# Patient Record
Sex: FEMALE | Race: WHITE | NOT HISPANIC OR LATINO
[De-identification: names, ages, dates, MRNs, and addresses within clinical notes are randomized per-mention and may not be internally consistent; named-entity substitution may affect disease eponyms.]

---

## 2017-01-05 ENCOUNTER — CHARTING TRANS (OUTPATIENT)
Dept: OTHER | Age: 57
End: 2017-01-05

## 2017-03-07 ENCOUNTER — CHARTING TRANS (OUTPATIENT)
Dept: OTHER | Age: 57
End: 2017-03-07

## 2017-07-10 ENCOUNTER — CHARTING TRANS (OUTPATIENT)
Dept: OTHER | Age: 57
End: 2017-07-10

## 2018-11-03 VITALS
HEART RATE: 78 BPM | BODY MASS INDEX: 28.73 KG/M2 | TEMPERATURE: 99.2 F | SYSTOLIC BLOOD PRESSURE: 122 MMHG | HEIGHT: 68 IN | RESPIRATION RATE: 16 BRPM | WEIGHT: 189.56 LBS | DIASTOLIC BLOOD PRESSURE: 72 MMHG

## 2018-11-05 VITALS
BODY MASS INDEX: 28.57 KG/M2 | TEMPERATURE: 98.5 F | SYSTOLIC BLOOD PRESSURE: 124 MMHG | DIASTOLIC BLOOD PRESSURE: 78 MMHG | RESPIRATION RATE: 18 BRPM | HEART RATE: 80 BPM | WEIGHT: 188.5 LBS | HEIGHT: 68 IN

## 2025-06-05 RX ORDER — FOLIC ACID 1 MG/1
TABLET ORAL WEEKLY
COMMUNITY

## 2025-06-05 RX ORDER — HYDROCODONE BITARTRATE AND ACETAMINOPHEN 5; 325 MG/1; MG/1
1 TABLET ORAL 2 TIMES DAILY PRN
COMMUNITY
End: 2025-06-11

## 2025-06-05 RX ORDER — ESCITALOPRAM OXALATE 20 MG/1
20 TABLET ORAL EVERY MORNING
COMMUNITY

## 2025-06-05 RX ORDER — MULTIVIT-MIN/IRON FUM/FOLIC AC 7.5 MG-4
1 TABLET ORAL DAILY
COMMUNITY

## 2025-06-05 RX ORDER — TRAZODONE HYDROCHLORIDE 50 MG/1
50 TABLET ORAL NIGHTLY PRN
COMMUNITY

## 2025-06-05 RX ORDER — LURASIDONE HYDROCHLORIDE 20 MG/1
10 TABLET, FILM COATED ORAL
COMMUNITY

## 2025-06-05 RX ORDER — CLONAZEPAM 0.5 MG/1
0.5 TABLET ORAL 2 TIMES DAILY PRN
COMMUNITY

## 2025-06-05 RX ORDER — DEXTROAMPHETAMINE SACCHARATE, AMPHETAMINE ASPARTATE, DEXTROAMPHETAMINE SULFATE AND AMPHETAMINE SULFATE 5; 5; 5; 5 MG/1; MG/1; MG/1; MG/1
TABLET ORAL EVERY MORNING
COMMUNITY

## 2025-06-05 RX ORDER — ALBUTEROL SULFATE 90 UG/1
INHALANT RESPIRATORY (INHALATION) EVERY 6 HOURS PRN
COMMUNITY

## 2025-06-05 RX ORDER — BUPROPION HYDROCHLORIDE 300 MG/1
300 TABLET ORAL EVERY MORNING
COMMUNITY

## 2025-06-05 RX ORDER — CELECOXIB 100 MG/1
100 CAPSULE ORAL 2 TIMES DAILY
COMMUNITY
End: 2025-06-11

## 2025-06-09 ENCOUNTER — ANESTHESIA EVENT (OUTPATIENT)
Dept: SURGERY | Facility: HOSPITAL | Age: 65
End: 2025-06-09
Payer: COMMERCIAL

## 2025-06-09 ENCOUNTER — HOSPITAL ENCOUNTER (OUTPATIENT)
Facility: HOSPITAL | Age: 65
Discharge: HOME | End: 2025-06-11
Attending: ORTHOPAEDIC SURGERY | Admitting: ORTHOPAEDIC SURGERY
Payer: COMMERCIAL

## 2025-06-09 ENCOUNTER — APPOINTMENT (OUTPATIENT)
Dept: GENERAL RADIOLOGY | Facility: HOSPITAL | Age: 65
End: 2025-06-09
Attending: ORTHOPAEDIC SURGERY
Payer: COMMERCIAL

## 2025-06-09 ENCOUNTER — ANESTHESIA (OUTPATIENT)
Dept: SURGERY | Facility: HOSPITAL | Age: 65
End: 2025-06-09
Payer: COMMERCIAL

## 2025-06-09 DIAGNOSIS — M48.02 CERVICAL SPINAL STENOSIS: Primary | ICD-10-CM

## 2025-06-09 PROBLEM — M54.12 CERVICAL RADICULOPATHY: Status: ACTIVE | Noted: 2025-06-09

## 2025-06-09 PROBLEM — G47.33 OSA (OBSTRUCTIVE SLEEP APNEA): Status: ACTIVE | Noted: 2025-06-09

## 2025-06-09 PROBLEM — F41.9 ANXIETY AND DEPRESSION: Status: ACTIVE | Noted: 2025-06-09

## 2025-06-09 PROBLEM — F32.A ANXIETY AND DEPRESSION: Status: ACTIVE | Noted: 2025-06-09

## 2025-06-09 LAB
HCT VFR BLD AUTO: 38.6 % (ref 35–48)
HGB BLD-MCNC: 13.1 G/DL (ref 12–16)

## 2025-06-09 PROCEDURE — 99204 OFFICE O/P NEW MOD 45 MIN: CPT | Performed by: HOSPITALIST

## 2025-06-09 PROCEDURE — 76000 FLUOROSCOPY <1 HR PHYS/QHP: CPT | Performed by: ORTHOPAEDIC SURGERY

## 2025-06-09 DEVICE — GRAFT BNE SUB 5CC M MTRX OSTEOCEL PRO: Type: IMPLANTABLE DEVICE | Site: NECK | Status: FUNCTIONAL

## 2025-06-09 DEVICE — COHERE CERVICAL, 6X14X12MM 7°
Type: IMPLANTABLE DEVICE | Site: SPINE CERVICAL | Status: FUNCTIONAL
Brand: COHERE

## 2025-06-09 DEVICE — IMPLANTABLE DEVICE: Type: IMPLANTABLE DEVICE | Site: NECK | Status: FUNCTIONAL

## 2025-06-09 DEVICE — IMPLANTABLE DEVICE: Type: IMPLANTABLE DEVICE | Site: SPINE CERVICAL | Status: FUNCTIONAL

## 2025-06-09 RX ORDER — SENNOSIDES 8.6 MG
17.2 TABLET ORAL NIGHTLY
Status: DISCONTINUED | OUTPATIENT
Start: 2025-06-09 | End: 2025-06-11

## 2025-06-09 RX ORDER — DEXTROAMPHETAMINE SACCHARATE, AMPHETAMINE ASPARTATE, DEXTROAMPHETAMINE SULFATE AND AMPHETAMINE SULFATE 2.5; 2.5; 2.5; 2.5 MG/1; MG/1; MG/1; MG/1
20 TABLET ORAL DAILY
Status: DISCONTINUED | OUTPATIENT
Start: 2025-06-10 | End: 2025-06-11

## 2025-06-09 RX ORDER — DOCUSATE SODIUM 100 MG/1
100 CAPSULE, LIQUID FILLED ORAL 2 TIMES DAILY
Status: DISCONTINUED | OUTPATIENT
Start: 2025-06-09 | End: 2025-06-11

## 2025-06-09 RX ORDER — POLYETHYLENE GLYCOL 3350 17 G/17G
17 POWDER, FOR SOLUTION ORAL DAILY PRN
Status: DISCONTINUED | OUTPATIENT
Start: 2025-06-09 | End: 2025-06-11

## 2025-06-09 RX ORDER — ONDANSETRON 2 MG/ML
INJECTION INTRAMUSCULAR; INTRAVENOUS AS NEEDED
Status: DISCONTINUED | OUTPATIENT
Start: 2025-06-09 | End: 2025-06-09 | Stop reason: SURG

## 2025-06-09 RX ORDER — MORPHINE SULFATE 4 MG/ML
4 INJECTION, SOLUTION INTRAMUSCULAR; INTRAVENOUS EVERY 10 MIN PRN
Status: DISCONTINUED | OUTPATIENT
Start: 2025-06-09 | End: 2025-06-09 | Stop reason: HOSPADM

## 2025-06-09 RX ORDER — LURASIDONE HYDROCHLORIDE 20 MG/1
10 TABLET, FILM COATED ORAL
Status: DISCONTINUED | OUTPATIENT
Start: 2025-06-10 | End: 2025-06-11

## 2025-06-09 RX ORDER — CYCLOBENZAPRINE HCL 5 MG
5 TABLET ORAL EVERY 6 HOURS PRN
Status: DISCONTINUED | OUTPATIENT
Start: 2025-06-09 | End: 2025-06-11

## 2025-06-09 RX ORDER — MORPHINE SULFATE 10 MG/ML
6 INJECTION, SOLUTION INTRAMUSCULAR; INTRAVENOUS EVERY 10 MIN PRN
Status: DISCONTINUED | OUTPATIENT
Start: 2025-06-09 | End: 2025-06-09 | Stop reason: HOSPADM

## 2025-06-09 RX ORDER — DEXAMETHASONE SODIUM PHOSPHATE 4 MG/ML
VIAL (ML) INJECTION AS NEEDED
Status: DISCONTINUED | OUTPATIENT
Start: 2025-06-09 | End: 2025-06-09 | Stop reason: SURG

## 2025-06-09 RX ORDER — SODIUM CHLORIDE 9 MG/ML
INJECTION, SOLUTION INTRAVENOUS CONTINUOUS
Status: DISCONTINUED | OUTPATIENT
Start: 2025-06-09 | End: 2025-06-11

## 2025-06-09 RX ORDER — BISACODYL 10 MG
10 SUPPOSITORY, RECTAL RECTAL
Status: DISCONTINUED | OUTPATIENT
Start: 2025-06-09 | End: 2025-06-11

## 2025-06-09 RX ORDER — HYDROMORPHONE HYDROCHLORIDE 1 MG/ML
0.5 INJECTION, SOLUTION INTRAMUSCULAR; INTRAVENOUS; SUBCUTANEOUS EVERY 2 HOUR PRN
Status: DISCONTINUED | OUTPATIENT
Start: 2025-06-09 | End: 2025-06-11

## 2025-06-09 RX ORDER — SODIUM CHLORIDE, SODIUM LACTATE, POTASSIUM CHLORIDE, CALCIUM CHLORIDE 600; 310; 30; 20 MG/100ML; MG/100ML; MG/100ML; MG/100ML
INJECTION, SOLUTION INTRAVENOUS CONTINUOUS
Status: DISCONTINUED | OUTPATIENT
Start: 2025-06-09 | End: 2025-06-11

## 2025-06-09 RX ORDER — MIDAZOLAM HYDROCHLORIDE 1 MG/ML
INJECTION INTRAMUSCULAR; INTRAVENOUS AS NEEDED
Status: DISCONTINUED | OUTPATIENT
Start: 2025-06-09 | End: 2025-06-09 | Stop reason: SURG

## 2025-06-09 RX ORDER — HYDROMORPHONE HYDROCHLORIDE 1 MG/ML
INJECTION, SOLUTION INTRAMUSCULAR; INTRAVENOUS; SUBCUTANEOUS AS NEEDED
Status: DISCONTINUED | OUTPATIENT
Start: 2025-06-09 | End: 2025-06-09 | Stop reason: SURG

## 2025-06-09 RX ORDER — ONDANSETRON 2 MG/ML
4 INJECTION INTRAMUSCULAR; INTRAVENOUS EVERY 6 HOURS PRN
Status: DISCONTINUED | OUTPATIENT
Start: 2025-06-09 | End: 2025-06-11

## 2025-06-09 RX ORDER — OXYCODONE AND ACETAMINOPHEN 10; 325 MG/1; MG/1
2 TABLET ORAL EVERY 6 HOURS PRN
Status: DISCONTINUED | OUTPATIENT
Start: 2025-06-09 | End: 2025-06-11

## 2025-06-09 RX ORDER — SODIUM CHLORIDE, SODIUM LACTATE, POTASSIUM CHLORIDE, CALCIUM CHLORIDE 600; 310; 30; 20 MG/100ML; MG/100ML; MG/100ML; MG/100ML
INJECTION, SOLUTION INTRAVENOUS CONTINUOUS
Status: DISCONTINUED | OUTPATIENT
Start: 2025-06-09 | End: 2025-06-09 | Stop reason: HOSPADM

## 2025-06-09 RX ORDER — METHOCARBAMOL 100 MG/ML
1000 INJECTION, SOLUTION INTRAMUSCULAR; INTRAVENOUS ONCE AS NEEDED
Status: DISCONTINUED | OUTPATIENT
Start: 2025-06-09 | End: 2025-06-09 | Stop reason: HOSPADM

## 2025-06-09 RX ORDER — NALOXONE HYDROCHLORIDE 0.4 MG/ML
80 INJECTION, SOLUTION INTRAMUSCULAR; INTRAVENOUS; SUBCUTANEOUS AS NEEDED
Status: DISCONTINUED | OUTPATIENT
Start: 2025-06-09 | End: 2025-06-09 | Stop reason: HOSPADM

## 2025-06-09 RX ORDER — SODIUM PHOSPHATE, DIBASIC AND SODIUM PHOSPHATE, MONOBASIC 7; 19 G/230ML; G/230ML
1 ENEMA RECTAL ONCE AS NEEDED
Status: DISCONTINUED | OUTPATIENT
Start: 2025-06-09 | End: 2025-06-11

## 2025-06-09 RX ORDER — TRANEXAMIC ACID 10 MG/ML
INJECTION, SOLUTION INTRAVENOUS AS NEEDED
Status: DISCONTINUED | OUTPATIENT
Start: 2025-06-09 | End: 2025-06-09 | Stop reason: SURG

## 2025-06-09 RX ORDER — ACETAMINOPHEN 500 MG
1000 TABLET ORAL ONCE
Status: DISCONTINUED | OUTPATIENT
Start: 2025-06-09 | End: 2025-06-09 | Stop reason: HOSPADM

## 2025-06-09 RX ORDER — DIPHENHYDRAMINE HCL 25 MG
25 CAPSULE ORAL EVERY 4 HOURS PRN
Status: DISCONTINUED | OUTPATIENT
Start: 2025-06-09 | End: 2025-06-11

## 2025-06-09 RX ORDER — BUPROPION HYDROCHLORIDE 300 MG/1
300 TABLET ORAL EVERY MORNING
Status: DISCONTINUED | OUTPATIENT
Start: 2025-06-10 | End: 2025-06-11

## 2025-06-09 RX ORDER — PROCHLORPERAZINE EDISYLATE 5 MG/ML
5 INJECTION INTRAMUSCULAR; INTRAVENOUS EVERY 8 HOURS PRN
Status: DISCONTINUED | OUTPATIENT
Start: 2025-06-09 | End: 2025-06-11

## 2025-06-09 RX ORDER — HYDROMORPHONE HYDROCHLORIDE 1 MG/ML
0.6 INJECTION, SOLUTION INTRAMUSCULAR; INTRAVENOUS; SUBCUTANEOUS EVERY 5 MIN PRN
Status: DISCONTINUED | OUTPATIENT
Start: 2025-06-09 | End: 2025-06-09 | Stop reason: HOSPADM

## 2025-06-09 RX ORDER — LIDOCAINE HYDROCHLORIDE 10 MG/ML
INJECTION, SOLUTION EPIDURAL; INFILTRATION; INTRACAUDAL; PERINEURAL AS NEEDED
Status: DISCONTINUED | OUTPATIENT
Start: 2025-06-09 | End: 2025-06-09 | Stop reason: SURG

## 2025-06-09 RX ORDER — OXYCODONE AND ACETAMINOPHEN 10; 325 MG/1; MG/1
1 TABLET ORAL EVERY 4 HOURS PRN
Status: DISCONTINUED | OUTPATIENT
Start: 2025-06-09 | End: 2025-06-11

## 2025-06-09 RX ORDER — MORPHINE SULFATE 4 MG/ML
2 INJECTION, SOLUTION INTRAMUSCULAR; INTRAVENOUS EVERY 10 MIN PRN
Status: DISCONTINUED | OUTPATIENT
Start: 2025-06-09 | End: 2025-06-09 | Stop reason: HOSPADM

## 2025-06-09 RX ORDER — HYDROMORPHONE HYDROCHLORIDE 1 MG/ML
0.4 INJECTION, SOLUTION INTRAMUSCULAR; INTRAVENOUS; SUBCUTANEOUS EVERY 5 MIN PRN
Status: DISCONTINUED | OUTPATIENT
Start: 2025-06-09 | End: 2025-06-09 | Stop reason: HOSPADM

## 2025-06-09 RX ORDER — HYDROMORPHONE HYDROCHLORIDE 1 MG/ML
0.2 INJECTION, SOLUTION INTRAMUSCULAR; INTRAVENOUS; SUBCUTANEOUS EVERY 5 MIN PRN
Status: DISCONTINUED | OUTPATIENT
Start: 2025-06-09 | End: 2025-06-09 | Stop reason: HOSPADM

## 2025-06-09 RX ORDER — HYDROMORPHONE HYDROCHLORIDE 1 MG/ML
1 INJECTION, SOLUTION INTRAMUSCULAR; INTRAVENOUS; SUBCUTANEOUS EVERY 2 HOUR PRN
Status: DISCONTINUED | OUTPATIENT
Start: 2025-06-09 | End: 2025-06-11

## 2025-06-09 RX ORDER — TRAZODONE HYDROCHLORIDE 50 MG/1
50 TABLET ORAL NIGHTLY PRN
Status: DISCONTINUED | OUTPATIENT
Start: 2025-06-09 | End: 2025-06-11

## 2025-06-09 RX ORDER — DIPHENHYDRAMINE HYDROCHLORIDE 50 MG/ML
25 INJECTION, SOLUTION INTRAMUSCULAR; INTRAVENOUS EVERY 4 HOURS PRN
Status: DISCONTINUED | OUTPATIENT
Start: 2025-06-09 | End: 2025-06-11

## 2025-06-09 RX ORDER — ACETAMINOPHEN 10 MG/ML
1000 INJECTION, SOLUTION INTRAVENOUS ONCE AS NEEDED
Status: COMPLETED | OUTPATIENT
Start: 2025-06-09 | End: 2025-06-11

## 2025-06-09 RX ORDER — ESCITALOPRAM OXALATE 20 MG/1
20 TABLET ORAL EVERY MORNING
Status: DISCONTINUED | OUTPATIENT
Start: 2025-06-10 | End: 2025-06-11

## 2025-06-09 RX ORDER — ALBUTEROL SULFATE 0.83 MG/ML
2.5 SOLUTION RESPIRATORY (INHALATION) AS NEEDED
Status: DISCONTINUED | OUTPATIENT
Start: 2025-06-09 | End: 2025-06-09 | Stop reason: HOSPADM

## 2025-06-09 RX ADMIN — TRANEXAMIC ACID 1000 MG: 10 INJECTION, SOLUTION INTRAVENOUS at 11:48:00

## 2025-06-09 RX ADMIN — DEXAMETHASONE SODIUM PHOSPHATE 10 MG: 4 MG/ML VIAL (ML) INJECTION at 11:54:00

## 2025-06-09 RX ADMIN — LIDOCAINE HYDROCHLORIDE 50 MG: 10 INJECTION, SOLUTION EPIDURAL; INFILTRATION; INTRACAUDAL; PERINEURAL at 11:40:00

## 2025-06-09 RX ADMIN — SODIUM CHLORIDE, SODIUM LACTATE, POTASSIUM CHLORIDE, CALCIUM CHLORIDE: 600; 310; 30; 20 INJECTION, SOLUTION INTRAVENOUS at 13:36:00

## 2025-06-09 RX ADMIN — ONDANSETRON 4 MG: 2 INJECTION INTRAMUSCULAR; INTRAVENOUS at 11:54:00

## 2025-06-09 RX ADMIN — MIDAZOLAM HYDROCHLORIDE 2 MG: 1 INJECTION INTRAMUSCULAR; INTRAVENOUS at 11:33:00

## 2025-06-09 RX ADMIN — SODIUM CHLORIDE, SODIUM LACTATE, POTASSIUM CHLORIDE, CALCIUM CHLORIDE: 600; 310; 30; 20 INJECTION, SOLUTION INTRAVENOUS at 11:33:00

## 2025-06-09 RX ADMIN — HYDROMORPHONE HYDROCHLORIDE 0.5 MG: 1 INJECTION, SOLUTION INTRAMUSCULAR; INTRAVENOUS; SUBCUTANEOUS at 12:49:00

## 2025-06-09 RX ADMIN — HYDROMORPHONE HYDROCHLORIDE 0.5 MG: 1 INJECTION, SOLUTION INTRAMUSCULAR; INTRAVENOUS; SUBCUTANEOUS at 12:00:00

## 2025-06-09 RX ADMIN — SODIUM CHLORIDE, SODIUM LACTATE, POTASSIUM CHLORIDE, CALCIUM CHLORIDE: 600; 310; 30; 20 INJECTION, SOLUTION INTRAVENOUS at 14:41:00

## 2025-06-09 NOTE — RESPIRATORY THERAPY NOTE
CPAP/BIPAP EVALUATION: Done     CPAP/BIPAP INITIATION: Patient refused CPAP during this admission.     NOTES: Patient was previously evaluated and diagnosed with OMAR. She refused CPAP during this admission. RT will be available for further assistance and provide necessary equipment.

## 2025-06-09 NOTE — BRIEF OP NOTE
Pre-Operative Diagnosis: Other cervical disc degeneration of cervical region, not otherwise specified, Spinal stenosis of cervical region     Post-Operative Diagnosis: Other cervical disc degeneration of cervical region, not otherwise specified, Spinal stenosis of cervical region      Procedure Performed:   C4-5, C5-6, C6-7 anterior cervical discectomy and fusion with use of allograft bone    Surgeons and Role:     * Shahida Bee MD - Primary    Assistant(s):  Moon Thomas PA-C     Surgical Findings: as expected     Specimen:      Estimated Blood Loss: Blood Output: 30 mL (6/9/2025  2:37 PM)      Dictation Number:      Moon Nguyen PA-C  6/9/2025  3:09 PM

## 2025-06-09 NOTE — CONSULTS
[facility]    PATIENT'S NAME: BRITTA STEWART   ATTENDING PHYSICIAN: Shahida Bee MD   CONSULTING PHYSICIAN: Israel Escamilla MD   PATIENT ACCOUNT#:   [id number]    LOCATION:  [facility] PACU 9 [facility] 10  MEDICAL RECORD #:   [id number]       YOB: 1960  ADMISSION DATE:       06/09/2025      CONSULT DATE:  06/09/2025    REPORT OF CONSULTATION      REASON FOR ADMISSION:  Post anterior cervical discectomy and fusion.    HISTORY OF PRESENT ILLNESS:  The patient is a 64-year-old  female with chronic neck pain and cervical spinal stenosis with radiculopathy, failed outpatient conservative medical management options, scheduled today for above-mentioned procedure by her orthopedic surgeon, Dr. Bee.  Postoperatively transferred to PACU for further monitoring.     PAST MEDICAL HISTORY:  Degenerative joint disease of cervical spine, anxiety, attention deficit disorder, fibromyalgia, obstructive sleep apnea.    PAST SURGICAL HISTORY:  Bilateral foot surgery.    MEDICATIONS:  Please see medication reconciliation list.     ALLERGIES:  No known drug allergies.     SOCIAL HISTORY:  No tobacco, alcohol, or drug use.  Independent for basic activities of daily living.    FAMILY HISTORY:  Mother had COPD.  Brother had heart disease.    REVIEW OF SYSTEMS:  Currently resting in bed.  Neck discomfort.  No upper extremity tingling or numbness.  No chest pain.  No shortness of breath.  Other 12-point review of systems negative.      PHYSICAL EXAMINATION:    GENERAL:  Alert, oriented to time, place, and person, mild to moderate distress.   VITAL SIGNS:  Temperature 97.2.  Pulse 76.  Respiratory rate 13.  Blood pressure 126/65.  Pulse ox 93% on room air.  HEENT:  Atraumatic.  Oropharynx clear.  Moist mucous membranes.   Ears, nose normal.  Eyes:  Anicteric sclerae.  NECK:  Cervical collar in place.  Anterior cervical dressing with Hemovac surgical drain.  LUNGS:  Clear to auscultation bilaterally.  Normal  respiratory effort.  HEART:  Regular rate and rhythm.  S1 and S2 auscultated.  No murmur.  ABDOMEN:  Soft, nondistended.  Positive bowel sounds.  EXTREMITIES:  No peripheral edema, clubbing, or cyanosis.  NEUROLOGIC:  Motor and sensory intact.     ASSESSMENT AND PLAN:    1.   Cervical spinal stenosis with radiculopathy status post C4-C5, C5-C6, and C6-C7 anterior cervical discectomy and fusion with use of allograft bone.  Monitor surgical wound and drain.  Neuro checks.  DVT prophylaxis.  Physical and occupational therapy.   2.   Obstructive sleep apnea.  Apply obstructive sleep apnea protocol and monitor.  3.   Anxiety and depression.  Continue home medications.    Dictated By Israel Escamilla MD  d: 06/09/2025 15:51:13  t: 06/09/2025 16:30:38  Job [id number]  FB/

## 2025-06-09 NOTE — ANESTHESIA POSTPROCEDURE EVALUATION
Patient: Tri Lawrence    Procedure Summary       Date: 06/09/25 Room / Location: [facility] MAIN OR 09 / [facility] MAIN OR    Anesthesia Start: 1133 Anesthesia Stop: 1516    Procedure: C4-5, C5-6, C6-7 anterior cervical discectomy and fusion with use of allograft bone (Spine Cervical) Diagnosis: (Other cervical disc degeneration of cervical region, not otherwise specified, Spinal stenosis of cervical region)    Surgeons: Shahida Bee MD Anesthesiologist: Winter Soliman MD    Anesthesia Type: general ASA Status: 2            Anesthesia Type: general    Vitals Value Taken Time   /75 06/09/25 15:12   Temp 97.2 °F (36.2 °C) 06/09/25 15:12   Pulse 73 06/09/25 15:16   Resp 12 06/09/25 15:16   SpO2 99 % 06/09/25 15:16   Vitals shown include unfiled device data.    EM AN Post Evaluation:   Patient Evaluated in PACU  Patient Participation: complete - patient cannot participate  Level of Consciousness: responsive to painful stimuli  Pain Management: adequate  Airway Patency:patent  Dental exam unchanged from preop  Yes    Nausea/Vomiting: none  Cardiovascular Status: acceptable  Respiratory Status: acceptable  Postoperative Hydration acceptable      Lexi Jansen CRNA  6/9/2025 3:16 PM

## 2025-06-09 NOTE — ANESTHESIA PROCEDURE NOTES
Airway  Date/Time: 6/9/2025 11:42 AM  Airway not difficult    General Information and Staff   Patient location during procedure: OR  Anesthesiologist: Winter Soliman MD  Resident/CRNA: Lexi Jansen CRNA  Performed: CRNA   Performed by: Lexi Jansen CRNA  Authorized by: Winter Soliman MD        Indications and Patient Condition  Indications for airway management: anesthesia  Sedation level: deep      Preoxygenated: yesPatient position: sniffing  MILS maintained throughout    Mask difficulty assessment: 0 - not attempted    Final Airway Details    Final airway type: endotracheal airway    Successful airway: ETT     Successful intubation technique: Video laryngoscopy  Endotracheal tube insertion site: oral  Blade type: Perez.  Blade size: #3  ETT size (mm): 7.0    Cormack-Lehane Classification: grade I - full view of glottis  Placement verified by: capnometry   Measured from: lips  ETT to lips (cm): 21  Number of attempts at approach: 1

## 2025-06-09 NOTE — ANESTHESIA PREPROCEDURE EVALUATION
Anesthesia PreOp Note    HPI:     Tri Lawrence is a 64 year old female who presents for preoperative consultation requested by: Shahida Bee MD    Date of Surgery: 6/9/2025    Procedure(s):  C4-5, C5-6, C6-7 anterior cervical discectomy and fusion with use of allograft bone  Indication: Other cervical disc degeneration of cervical region, not otherwise specified, Spinal stenosis of cervical region    Relevant Problems   No relevant active problems       NPO:  Last Liquid Consumption Date: 06/08/25  Last Liquid Consumption Time: 2000  Last Solid Consumption Date: 06/08/25  Last Solid Consumption Time: 2300  Last Liquid Consumption Date: 06/08/25          History Review:  There are no active problems to display for this patient.      Past Medical History[1]    Past Surgical History[2]    Prescriptions Prior to Admission[3]  Current Medications and Prescriptions Ordered in Epic[4]    Allergies[5]    Family History[6]  Social Hx on file[7]    Available pre-op labs reviewed.             Vital Signs:  Body mass index is 27.67 kg/m².   height is 1.727 m (5' 8\") and weight is 82.6 kg (182 lb). Her oral temperature is 98.3 °F (36.8 °C). Her blood pressure is 134/60 and her pulse is 62. Her respiration is 16 and oxygen saturation is 95%.   Vitals:    06/05/25 1430 06/09/25 0750 06/09/25 0802   BP:   134/60   Pulse:   62   Resp:   16   Temp:   98.3 °F (36.8 °C)   TempSrc:   Oral   SpO2:   95%   Weight: 80.7 kg (178 lb) 82.6 kg (182 lb)    Height: 1.727 m (5' 8\")          Anesthesia Evaluation     Patient summary reviewed and Nursing notes reviewed    History of anesthetic complications   Airway   Mallampati: II  Neck ROM: limited  Dental    (+) implants        Pulmonary - normal exam   (+) sleep apnea on CPAP  Cardiovascular - normal exam  Exercise tolerance: good  (-) past MI, CAD    NYHA Classification: I  ECG reviewed  ROS comment: Rest       ECG    Normal sinus rhythm.    No evidence of myocardial ischemia.        Standing HR:60 bpmStanding BP:110/74 mmHg       Stress       Peak HR: 116 bpm                           HR BP Product: 25772    Peak BP: 144/62 mmHg                       Max Exercise: 7.7 METS    Predicted HR: 162 bpm    % of predicted HR: 72    Test Duration: 6:31 min    Reason for Termination: Dyspnea       Stress Interpretation       With exercise, there is appropriate augmentation of all visualized    myocardial segments.       Results       ECG    The patient developed 1 mm of horizontal ST depression in the    inferolateral leads with exercise that resolved in recovery.       Arrhythmias    No arrhythmias were observed during the test.       Symptoms       Neuro/Psych    (+)  neuromuscular disease, anxiety/panic attacks,  attention deficit hyperactivity disorder, depression      GI/Hepatic/Renal    (-) hepatitis, liver disease, renal disease    Comments: Vocal cords condition : no special treatment     Endo/Other    (-) diabetes mellitus, hypothyroidism    Comments: DR Lilly clearance   Abdominal  - normal exam                 Anesthesia Plan:   ASA:  2  Plan:   General  Airway:  Video laryngoscope and ETT  Informed Consent Plan and Risks Discussed With:  Patient  Discussed plan with:  CRNA, attending and surgeon  Provider Attestation (if preop done by other):  GA/ETT video /PONV/dental damages etc      I have informed Tri Lawrence and/or legal guardian or family member of the nature of the anesthetic plan, benefits, risks including possible dental damage if relevant, major complications, and any alternative forms of anesthetic management.   All of the patient's questions were answered to the best of my ability. The patient desires the anesthetic management as planned.  ANNIKA BARRIENTOS MD  6/9/2025 10:08 AM  Present on Admission:  **None**           [1]   Past Medical History:   Anesthesia complication    come out slowly    Anxiety state    Attention deficit hyperactivity disorder (ADHD)    Back problem     Depression    Fibromyalgia    Migraines    Osteoarthritis    Sleep apnea    uses CPAP on and off    Visual impairment    glasses    Vocal cord dysfunction   [2]   Past Surgical History:  Procedure Laterality Date    Other surgical history Bilateral     feet surgery  x2   [3]   Medications Prior to Admission   Medication Sig Dispense Refill Last Dose/Taking    HYDROcodone-acetaminophen 5-325 MG Oral Tab Take 1 tablet by mouth 2 (two) times daily as needed for Pain.   6/9/2025 at  5:00 AM    buPROPion  MG Oral Tablet 24 Hr Take 1 tablet (300 mg total) by mouth every morning.   6/9/2025 at  5:00 AM    escitalopram 20 MG Oral Tab Take 1 tablet (20 mg total) by mouth every morning.   6/9/2025 at  5:00 AM    amphetamine-dextroamphetamine 20 MG Oral Tab Take by mouth every morning.   6/9/2025 at  5:00 AM    celecoxib 100 MG Oral Cap Take 1 capsule (100 mg total) by mouth 2 (two) times daily.   5/29/2025    lurasidone 20 MG Oral Tab Take 0.5 tablets (10 mg total) by mouth daily with breakfast.   5/22/2025    clonazePAM 0.5 MG Oral Tab Take 1 tablet (0.5 mg total) by mouth 2 (two) times daily as needed for Anxiety.   Past Month    traZODone 50 MG Oral Tab Take 1 tablet (50 mg total) by mouth nightly as needed for Sleep.   6/8/2025 at  9:00 PM    Cholecalciferol (VITAMIN D3) 1.25 MG (80752 UT) Oral Cap Take by mouth once a week.   Past Week    Multiple Vitamins-Minerals (MULTI-VITAMIN/MINERALS) Oral Tab Take 1 tablet by mouth daily.   Past Week    albuterol 108 (90 Base) MCG/ACT Inhalation Aero Soln Inhale into the lungs every 6 (six) hours as needed for Wheezing.   More than a month   [4]   Current Facility-Administered Medications Ordered in Epic   Medication Dose Route Frequency Provider Last Rate Last Admin    lactated ringers infusion   Intravenous Continuous Shahida Bee MD 20 mL/hr at 06/09/25 0806 New Bag at 06/09/25 0806    acetaminophen (Tylenol Extra Strength) tab 1,000 mg  1,000 mg Oral Once Rohit  MD Shahida        ceFAZolin (Ancef) 2g in 10mL IV syringe premix  2 g Intravenous 30 Min Pre-Op Moon Nguyen PA-C         No current Epic-ordered outpatient medications on file.   [5]   Allergies  Allergen Reactions    Kenalog Tightness in Throat   [6]   Family History  Problem Relation Age of Onset    COPD Mother     Heart Disorder Brother    [7]   Social History  Socioeconomic History    Marital status:    Tobacco Use    Smoking status: Never    Smokeless tobacco: Never   Vaping Use    Vaping status: Never Used   Substance and Sexual Activity    Alcohol use: Never    Drug use: Never

## 2025-06-09 NOTE — INTERVAL H&P NOTE
Pre-op Diagnosis: Other cervical disc degeneration of cervical region, not otherwise specified, Spinal stenosis of cervical region    The above referenced H&P was reviewed by Shahida Bee MD, SHAHAB on 6/9/2025, the patient was examined and no significant changes have occurred in the patient's condition since the H&P was performed.  I discussed with the patient and/or legal representative the potential benefits, risks and side effects of this procedure; the likelihood of the patient achieving goals; and potential problems that might occur during recuperation.  I discussed reasonable alternatives to the procedure, including risks, benefits and side effects related to the alternatives and risks related to not receiving this procedure.  We will proceed with procedure as planned.

## 2025-06-09 NOTE — OPERATIVE REPORT
\  PREOPERATIVE DIAGNOSIS:    1.       Cervical spondylosis.  2.       Cervical stenosis.  3.       Cervical Radiculopathy.        POSTOPERATIVE DIAGNOSIS:    1.       Cervical spondylosis.  2.       Cervical stenosis.  3.       Cervical Radiculopathy.  .     PROCEDURE:    1.       Anterior cervical discectomy and fusion at C4-5, C5-6, C6-7.  2.       Use of interbody cage at C4-5, C5-6, C6-7 .  3.       Use of local autograft bone.  4.       Use of allograft bone.  5.       Anterior cervical instrumentation spanning the C4-5, C5-6, C6-7 disc space with an anterior cervical plate and screws.  6.       Use of intraoperative fluoroscopy.  7.       Use of intraoperative neuromonitoring.           ASSISTANTS:   Moon Aguero PA-C, who assisted in positioning, prepping, draping, retracting, and wound closure.     ANESTHESIA:  General endotracheal.     COMPLICATIONS:  None.     ESTIMATED BLOOD LOSS:  30 mL.      DEEP VEIN THROMBOSIS PROPHYLAXIS:  SCDs and TEDs to lower extremities.     PREOPERATIVE ANTIBIOTICS:  Ancef.     IMPLANTS:  Nuvasive.     INDICATIONS:  Patient is a 64-year-old female with neck pain and upper extremity radiculopathy.  /he presented to my office with neck pain and upper extremity radicular symptoms which failed conservative management.  The MRI did show moderate central and foraminal stenosis from disc/osteophytes at the 3 levels.  I recommended surgical intervention and discussed that in detail with the patient.  Informed consent for surgery was obtained.  I specifically discussed with the patient that the risks surgery include, but are not limited to, infection, nerve injury, vessel injury, need for reoperation, possibility he may not improve, anesthetic complications, cardiac complications, and death in the perioperative period.     OPERATIVE TECHNIQUE:  The patient was seen preoperatively and surgical site was marked.  SCDs and TEDs were placed on the lower extremities.  She was brought to  the OR and, after a successful induction of anesthesia, needles for neuromonitoring were placed in the upper and lower extremities.  A Roberts catheter was placed.  The neck was then prepped and draped in the usual manner for a procedure of this sort.  Time-out was then performed.  We then used intraoperative fluoroscopy to tom the skin for our left-sided incision.  A left carotid incision was made centered with the C5-6 level on the left side.  A knife was used for the skin, electrocautery for hemostasis.  The platysma was incised in line with the skin incision, and careful dissection was carried down through avascular planes exposing the C4, C5, C6 and C7 vertebral segments.  We verified our levels on lateral fluoroscopy.  We elevated the longus colli muscles bilaterally at C4, C5, C6 and C7 .  The discectomy part by first using a knife to create an annulotomy at C5-6 and then using curettes and pituitaries to perform complete discectomy.  Posterior longitudinal ligament was released.   Bilateral foraminotomies were performed.  We trialed at the C4-5 level and elected to use a 6 mm cage.  Local autograft from the endplate osteophytes was combined with allograft bone and placed into the cage, and the cage was inserted under fluoroscopic guidance.       Once that was in good position, we moved the retractor down to the C5-6 level. A knife was used to create an annulotomy.  Curettes and pituitaries were used to perform complete discectomy and expose the posterior longitudinal ligament.    I burred the endplates and the bone was saved to be used as autograft.  Posterior longitudinal ligament was then released using 1 and 2 mm Kerrisons, thereby decompressing the central canal as well as the foramen bilaterally.  We again trialed and elected to use an 6 mm cage at this level.  We placed local autograft and allograft bone in the cage and placed the cage under fluoroscopic guidance.  Once that was in good position, we  copiously irrigated the wound and then moved the retractor down to below the longus colli muscles at the C6-7 level.  Discectomy was performed by first using a knife to create an annulotomy.  Curettes and pituitaries were used to perform a complete discectomy.   We removed that using 1 and 2 mm Kerrisons to remove the posterior longitudinal ligament.  There was a large disc osteophyte at this level which was removed using a small ball probe and pituitary.  Bilateral foraminotomies were performed using 1 and 2 mm Kerrisons.  At this level, we elected to use a 6 mm cage.  I placed local autograft and allograft bone into the cage and then placed it under fluoroscopic guidance.  An anterior cervical plate was then placed spanning the C4 through C7 level and 13 mm screws, 2 in each segment, were used to secure the plate down.  Locking mechanism was turned over each screw head to prevent screw backout.  Copious irrigation of the wound was again performed.  Bipolar electrocautery was used to control some mild bleeding from the epidural veins.  Final AP and lateral images were taken showing that the hardware was in good position.  We then placed a medium Hemovac drain, exiting from the same incision, I then closed the platysma using 2-0 Vicryl, subcutaneous was then closed with 3-0 Vicryl, and then Steri-Strips and skin glue for the skin.  Sterile dressing was applied.  The drapes were removed.  Patient was gently moved to supine position on a regular bed and extubated in the OR.  She was taken to PACU in good condition.  There were no complications during the procedure.  I was present throughout the case.  All counts were correct.

## 2025-06-10 LAB
ANION GAP SERPL CALC-SCNC: 8 MMOL/L (ref 0–18)
BUN BLD-MCNC: 12 MG/DL (ref 9–23)
BUN/CREAT SERPL: 12.9 (ref 10–20)
CALCIUM BLD-MCNC: 9.2 MG/DL (ref 8.7–10.4)
CHLORIDE SERPL-SCNC: 105 MMOL/L (ref 98–112)
CO2 SERPL-SCNC: 27 MMOL/L (ref 21–32)
CREAT BLD-MCNC: 0.93 MG/DL (ref 0.55–1.02)
EGFRCR SERPLBLD CKD-EPI 2021: 69 ML/MIN/1.73M2 (ref 60–?)
GLUCOSE BLD-MCNC: 114 MG/DL (ref 70–99)
HCT VFR BLD AUTO: 35 % (ref 35–48)
HCT VFR BLD AUTO: 36.8 % (ref 35–48)
HCT VFR BLD AUTO: 36.9 % (ref 35–48)
HGB BLD-MCNC: 12.2 G/DL (ref 12–16)
HGB BLD-MCNC: 12.4 G/DL (ref 12–16)
HGB BLD-MCNC: 12.7 G/DL (ref 12–16)
OSMOLALITY SERPL CALC.SUM OF ELEC: 291 MOSM/KG (ref 275–295)
POTASSIUM SERPL-SCNC: 4.2 MMOL/L (ref 3.5–5.1)
SODIUM SERPL-SCNC: 140 MMOL/L (ref 136–145)

## 2025-06-10 PROCEDURE — 99215 OFFICE O/P EST HI 40 MIN: CPT | Performed by: INTERNAL MEDICINE

## 2025-06-10 NOTE — PROGRESS NOTES
As per Dr Nguyen requested old hemovac changed out to check if leak. New hemovac not staying compressed. No visible drainage noted, no problems swallowing and no new feeling in throat stated by pt
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
## 2025-06-10 NOTE — OCCUPATIONAL THERAPY NOTE
OCCUPATIONAL THERAPY EVALUATION - INPATIENT     Room Number: 401/401-A  Evaluation Date: 6/10/2025  Type of Evaluation: Initial  Presenting Problem: C4-7 ACDF; aspen collar to be worn at all times    Physician Order: IP Consult to Occupational Therapy  Reason for Therapy: ADL/IADL Dysfunction and Discharge Planning    OCCUPATIONAL THERAPY ASSESSMENT   Patient is a 64 year old female admitted 2025 for C4-C7 ACDF.  Prior to admission, patient's baseline is Mod I .  Patient is currently functioning near baseline with self care and basic mobility.    PLAN  Patient has been evaluated and presents with no skilled Occupational Therapy needs  at this time.  Patient will be discharged from Occupational Therapy services. Please re-order if a new functional limitation presents during this admission.    OT Device Recommendations: None    OCCUPATIONAL THERAPY MEDICAL/SOCIAL HISTORY   Problem List  Principal Problem:    Cervical spinal stenosis  Active Problems:    Anxiety and depression    OMAR (obstructive sleep apnea)    Cervical radiculopathy    HOME SITUATION  Type of Home: House  Home Layout: Two level  Lives With: Spouse  Toilet and Equipment: Comfort height toilet; Grab bar  Shower/Tub and Equipment: Walk-in shower; Shower chair; Grab bar  Other Equipment: -- (All necessary equipment available)  Drives: Yes  Patient Regularly Uses: None    SUBJECTIVE  Cooperative     OCCUPATIONAL THERAPY EXAMINATION    OBJECTIVE  Precautions: Spine; Cervical brace  Fall Risk: Standard fall risk    WEIGHT BEARING RESTRICTION     PAIN ASSESSMENT  Ratin  Location: surgical  Management Techniques: Activity promotion    ACTIVITIES OF DAILY LIVING ASSESSMENT  AM-PAC ‘6-Clicks’ Inpatient Daily Activity Short Form  How much help from another person does the patient currently need…  -   Putting on and taking off regular lower body clothing?: A Little  -   Bathing (including washing, rinsing, drying)?: A Little  -   Toileting, which includes  using toilet, bedpan or urinal? : A Little  -   Putting on and taking off regular upper body clothing?: A Little  -   Taking care of personal grooming such as brushing teeth?: None  -   Eating meals?: None    AM-PAC Score:  Score: 20  Approx Degree of Impairment: 38.32%  Standardized Score (AM-PAC Scale): 42.03  CMS Modifier (G-Code): CJ    FUNCTIONAL TRANSFER ASSESSMENT  Sit to Stand: Edge of Bed  Edge of Bed: Supervision  Toilet Transfer: Supervision    BED MOBILITY  Rolling: Supervision  Supine to Sit : Supervision  Sit to Supine (OT): Supervision  Scooting: sup    BALANCE ASSESSMENT  Static Sitting: Supervision  Static Standing: Supervision    FUNCTIONAL ADL ASSESSMENT  Eating: Independent  Grooming Seated: Independent  Bathing Seated: Supervision  UB Dressing Seated: Supervision  LB Dressing Seated: Minimal Assist  Toileting Seated: Supervision           ADL Retraining/Engagment:  Extra time was spent with patient reviewing \"Back on Track\" handout to address specific self care compensations and higher level IADL with return to home. Educated on optimal setup for items at home to reduce excessive reaching and bending. Patient up and ambulatory with SBA; fxl t/f with SBA; pt has no unmet OT needs at the acute care level. If any new needs arise, please re-order OT    Education Provided: Role of OT, POC, Safety, DC recs, Activity promotion, Activity pacing, Energy Conservation Strategies, Return to Home Safety.         EDUCATION PROVIDED  Patient Education : Role of Occupational Therapy; Plan of Care; Discharge Recommendations; Functional Transfer Techniques; Fall Prevention; Surgical Precautions; Posture/Positioning; Proper Body Mechanics  Patient's Response to Education: Verbalized Understanding    The patient's Approx Degree of Impairment: 38.32% has been calculated based on documentation in the [facility] '6 clicks' Inpatient Daily Activity Short Form.  Research supports that patients with this level of impairment  may benefit from Home with support.  Final disposition will be made by interdisciplinary medical team.    Patient End of Session: Up in chair, Needs met      Patient Evaluation Complexity Level:   Occupational Profile/Medical History LOW - Brief history including review of medical or therapy records    Specific performance deficits impacting engagement in ADL/IADL LOW  1 - 3 performance deficits    Client Assessment/Performance Deficits LOW - No comorbidities nor modifications of tasks    Clinical Decision Making LOW - Analysis of occupational profile, problem-focused assessments, limited treatment options    Overall Complexity LOW     OT Session Time: 15 minutes  Self-Care Home Management: 15 minutes

## 2025-06-10 NOTE — PROGRESS NOTES
[facility]  part of [facility] and Homeist Progress Note     [place] Patient Status:  Outpatient in a Bed    1960  64 year old CSN [id number]   Location 401/401-A Attending Fadi Valerio MD   Hosp Day # 0 PCP No primary care provider on file.       Subjective:   ----------------------------------  Pt resting in bed in NAD. No overnight events. Tolerating PO diet, c/o mild throat pain.       Objective:   Chief Complaint: cervical radiculopathy  ----------------------------------  Temp:  [97.2 °F (36.2 °C)-98.5 °F (36.9 °C)] 97.8 °F (36.6 °C)  Pulse:  [65-83] 71  Resp:  [11-20] 18  BP: (101-148)/(62-75) 101/62  SpO2:  [93 %-99 %] 94 %  Gen: A+Ox3.  No distress.   HEENT: NCAT, neck supple, no carotid bruit.  CV: RRR, S1S2, and intact distal pulses. No gallop, rub, murmur.  Pulm: Effort and breath sounds normal. No distress, wheezes, rales, rhonchi.  Abd: Soft, NTND, BS normal, no mass, no HSM, no rebound/guarding.   Neuro: Normal reflexes, CN. Sensory/motor exams grossly normal deficit.   MSK: No joint effusions.  No peripheral edema. +cervical brace, tenderness  Skin: Skin is warm and dry. No rashes, erythema, diaphoresis.   Psych: Normal mood and affect. Calm, cooperative    Labs:  Lab Results   Component Value Date    HGB 12.2 06/10/2025     06/10/2025    K 4.2 06/10/2025    CREATSERUM 0.93 06/10/2025    BILIRUBTOTAL 1.4 (H) 2019    AST 21 2019    ALT 27 2019           Scheduled Medications[1]  PRN Medications[2]      Other problems      Supplementary Documentation:   DVT Mechanical Prophylaxis: DELISA hose, SCDs, Early ambuation  DVT Pharmacologic Prophylaxis   Medication   None                Code Status: Full Code  Roberts: No urinary catheter in place  Roberts Duration (in days):   Central line:    JONH: 2025           I personally reviewed the available laboratories, imaging including. I discussed/will discuss the case with consultants. I ordered  laboratories and/or radiographic studies. I adjusted medications as detailed above.  Medical decision making high, risk is high.    Assessment & Plan:   ----------------------------------    Cervical spinal stenosis with radiculopathy status post C4-C5, C5-C6, and C6-C7 anterior cervical discectomy and fusion with use of allograft bone.    POD #1  Pain control  PT/OT  Ortho following  Cont w/ cervical brace  Monitor drain output      Obstructive sleep apnea.    Cont CPAP      Anxiety and depression.    Continue wellbutrin, lexapro, latuda, adderall, trazodone    Fadi Valerio MD  6/10/2025         [1]    amphetamine-dextroamphetamine  20 mg Oral Daily    buPROPion ER  300 mg Oral QAM    escitalopram  20 mg Oral QAM    lurasidone  10 mg Oral Daily with breakfast    sennosides  17.2 mg Oral Nightly    docusate sodium  100 mg Oral BID    ceFAZolin  2 g Intravenous Q8H   [2]   traZODone    sodium chloride    polyethylene glycol (PEG 3350)    magnesium hydroxide    bisacodyl    fleet enema    ondansetron    prochlorperazine    diphenhydrAMINE **OR** diphenhydrAMINE    benzocaine-menthol    HYDROmorphone **OR** HYDROmorphone    cyclobenzaprine    oxyCODONE-acetaminophen    oxyCODONE-acetaminophen

## 2025-06-10 NOTE — PROGRESS NOTES
[facility]   Dr. Shahida Bee MD, SHAHAB  Susana Coronado, PAZeeC  Moon Nguyen PA-C    [address] [place] [address]  Office [phone]  Fax [phone]     No C/O of CP SOB NV. Pain controlled on Percocet 10mg, Dilaudid, and Ofirmev. Arm pain improved  AVSS  Dressing is CDI  Drain output 0cc in the last 8 hours  Motor to bilateral UE 5/5 throughout  Nv intact  Homans neg    S/p C4-7 ACDF    Continue drain and antibiotics  Mobilize  PT/OT  NO lifting over 10 pounds or ROM of the cervical spine  Cervical brace on at all times  May shower 48 hours after drain pulled with incision covered  Daily dressing changes to start upon drain being pulled  Patient to stay another night , goal is for pain control.

## 2025-06-10 NOTE — PLAN OF CARE
Problem: PAIN - ADULT  Goal: Verbalizes/displays adequate comfort level or patient's stated pain goal  Description: INTERVENTIONS:  - Encourage pt to monitor pain and request assistance  - Assess pain using appropriate pain scale  - Administer analgesics based on type and severity of pain and evaluate response  - Implement non-pharmacological measures as appropriate and evaluate response  - Consider cultural and social influences on pain and pain management  - Manage/alleviate anxiety  - Utilize distraction and/or relaxation techniques  - Monitor for opioid side effects  - Notify MD/LIP if interventions unsuccessful or patient reports new pain  - Anticipate increased pain with activity and pre-medicate as appropriate  Outcome: Progressing     Problem: SAFETY ADULT - FALL  Goal: Free from fall injury  Description: INTERVENTIONS:  - Assess pt frequently for physical needs  - Identify cognitive and physical deficits and behaviors that affect risk of falls.  - Klemme fall precautions as indicated by assessment.  - Educate pt/family on patient safety including physical limitations  - Instruct pt to call for assistance with activity based on assessment  - Modify environment to reduce risk of injury  - Provide assistive devices as appropriate  - Consider OT/PT consult to assist with strengthening/mobility  - Encourage toileting schedule  Outcome: Progressing     Problem: DISCHARGE PLANNING  Goal: Discharge to home or other facility with appropriate resources  Description: INTERVENTIONS:  - Identify barriers to discharge w/pt and caregiver  - Include patient/family/discharge partner in discharge planning  - Arrange for needed discharge resources and transportation as appropriate  - Identify discharge learning needs (meds, wound care, etc)  - Arrange for interpreters to assist at discharge as needed  - Consider post-discharge preferences of patient/family/discharge partner  - Complete POLST form as appropriate  - Assess  patient's ability to be responsible for managing their own health  - Refer to Case Management Department for coordinating discharge planning if the patient needs post-hospital services based on physician/LIP order or complex needs related to functional status, cognitive ability or social support system  Outcome: Progressing     Problem: SKIN/TISSUE INTEGRITY - ADULT  Goal: Skin integrity remains intact  Description: INTERVENTIONS  - Assess and document risk factors for pressure ulcer development  - Assess and document skin integrity  - Monitor for areas of redness and/or skin breakdown  - Initiate interventions, skin care algorithm/standards of care as needed  Outcome: Progressing  Goal: Incision(s), wounds(s) or drain site(s) healing without S/S of infection  Description: INTERVENTIONS:  - Assess and document risk factors for pressure ulcer development  - Assess and document skin integrity  - Assess and document dressing/incision, wound bed, drain sites and surrounding tissue  - Implement wound care per orders  - Initiate isolation precautions as appropriate  - Initiate Pressure Ulcer prevention bundle as indicated  Outcome: Progressing     Problem: MUSCULOSKELETAL - ADULT  Goal: Return mobility to safest level of function  Description: INTERVENTIONS:  - Assess patient stability and activity tolerance for standing, transferring and ambulating w/ or w/o assistive devices  - Assist with transfers and ambulation using safe patient handling equipment as needed  - Ensure adequate protection for wounds/incisions during mobilization  - Obtain PT/OT consults as needed  - Advance activity as appropriate  - Communicate ordered activity level and limitations with patient/family  Outcome: Progressing  Goal: Maintain proper alignment of affected body part  Description: INTERVENTIONS:  - Support and protect limb and body alignment per provider's orders  - Instruct and reinforce with patient and family use of appropriate  assistive device and precautions (e.g. spinal or hip dislocation precautions)  Outcome: Progressing     Problem: Impaired Swallowing  Goal: Minimize aspiration risk  Description: Interventions:  - Patient should be alert and upright for all feedings (90 degrees preferred)  - Offer food and liquids at a slow rate  - No straws  - Encourage small bites of food and small sips of liquid  - Offer pills one at a time, crush or deliver with applesauce as needed  - Discontinue feeding and notify MD (or speech pathologist) if coughing or persistent throat clearing or wet/gurgly vocal quality is noted  Outcome: Progressing   Pt alert and oriented, up with min assist with walker. Worked with therapy and they clear her, voiding freely dressing dry and intact with aspen collar.Hemovac also in place not holding suction very well  surgery team aware. No sob or distress. Percocet as needed for pain

## 2025-06-11 VITALS
HEIGHT: 68 IN | BODY MASS INDEX: 27.58 KG/M2 | RESPIRATION RATE: 16 BRPM | SYSTOLIC BLOOD PRESSURE: 119 MMHG | HEART RATE: 64 BPM | WEIGHT: 182 LBS | OXYGEN SATURATION: 95 % | DIASTOLIC BLOOD PRESSURE: 66 MMHG | TEMPERATURE: 99 F

## 2025-06-11 LAB
ANION GAP SERPL CALC-SCNC: 8 MMOL/L (ref 0–18)
BASOPHILS # BLD AUTO: 0.03 X10(3) UL (ref 0–0.2)
BASOPHILS NFR BLD AUTO: 0.4 %
BUN BLD-MCNC: 13 MG/DL (ref 9–23)
BUN/CREAT SERPL: 13.5 (ref 10–20)
CALCIUM BLD-MCNC: 9.2 MG/DL (ref 8.7–10.4)
CHLORIDE SERPL-SCNC: 106 MMOL/L (ref 98–112)
CO2 SERPL-SCNC: 30 MMOL/L (ref 21–32)
CREAT BLD-MCNC: 0.96 MG/DL (ref 0.55–1.02)
DEPRECATED RDW RBC AUTO: 40.6 FL (ref 35.1–46.3)
EGFRCR SERPLBLD CKD-EPI 2021: 66 ML/MIN/1.73M2 (ref 60–?)
EOSINOPHIL # BLD AUTO: 0.04 X10(3) UL (ref 0–0.7)
EOSINOPHIL NFR BLD AUTO: 0.5 %
ERYTHROCYTE [DISTWIDTH] IN BLOOD BY AUTOMATED COUNT: 12.2 % (ref 11–15)
GLUCOSE BLD-MCNC: 87 MG/DL (ref 70–99)
HCT VFR BLD AUTO: 35.4 % (ref 35–48)
HGB BLD-MCNC: 11.6 G/DL (ref 12–16)
IMM GRANULOCYTES # BLD AUTO: 0.02 X10(3) UL (ref 0–1)
IMM GRANULOCYTES NFR BLD: 0.2 %
LYMPHOCYTES # BLD AUTO: 2.44 X10(3) UL (ref 1–4)
LYMPHOCYTES NFR BLD AUTO: 30.4 %
MCH RBC QN AUTO: 29.8 PG (ref 26–34)
MCHC RBC AUTO-ENTMCNC: 32.8 G/DL (ref 31–37)
MCV RBC AUTO: 91 FL (ref 80–100)
MONOCYTES # BLD AUTO: 0.61 X10(3) UL (ref 0.1–1)
MONOCYTES NFR BLD AUTO: 7.6 %
NEUTROPHILS # BLD AUTO: 4.89 X10 (3) UL (ref 1.5–7.7)
NEUTROPHILS # BLD AUTO: 4.89 X10(3) UL (ref 1.5–7.7)
NEUTROPHILS NFR BLD AUTO: 60.9 %
OSMOLALITY SERPL CALC.SUM OF ELEC: 297 MOSM/KG (ref 275–295)
PLATELET # BLD AUTO: 264 10(3)UL (ref 150–450)
POTASSIUM SERPL-SCNC: 3.7 MMOL/L (ref 3.5–5.1)
RBC # BLD AUTO: 3.89 X10(6)UL (ref 3.8–5.3)
SODIUM SERPL-SCNC: 144 MMOL/L (ref 136–145)
WBC # BLD AUTO: 8 X10(3) UL (ref 4–11)

## 2025-06-11 PROCEDURE — 99214 OFFICE O/P EST MOD 30 MIN: CPT | Performed by: INTERNAL MEDICINE

## 2025-06-11 RX ORDER — OXYCODONE AND ACETAMINOPHEN 10; 325 MG/1; MG/1
TABLET ORAL
Qty: 45 TABLET | Refills: 0 | Status: SHIPPED | OUTPATIENT
Start: 2025-06-11

## 2025-06-11 RX ORDER — CYCLOBENZAPRINE HCL 5 MG
5 TABLET ORAL EVERY 6 HOURS PRN
Qty: 45 TABLET | Refills: 1 | Status: SHIPPED | OUTPATIENT
Start: 2025-06-11

## 2025-06-11 NOTE — DISCHARGE SUMMARY
[facility]  part of [facility]    DISCHARGE SUMMARY     [place] Patient Status:  Outpatient in a Bed    1960 MRN [id number]   Location [facility] 4W/SW/SE Attending Selena Rao MD   Hosp Day # 0 PCP No primary care provider on file.     Date of Admission: 2025  Date of Discharge:  2025     Discharge Disposition: Final discharge disposition not confirmed    Discharge Diagnosis:     Cervical spinal stenosis with radiculopathy status post C4-C5, C5-C6, and C6-C7 anterior cervical discectomy and fusion with use of allograft bone.      History of Present Illness:     64-year-old  female with chronic neck pain and cervical spinal stenosis with radiculopathy, failed outpatient conservative medical management options, scheduled today for above-mentioned procedure by her orthopedic surgeon, Dr. Bee. Postoperatively transferred to PACU for further monitoring.     Brief Synopsis:      Cervical spinal stenosis with radiculopathy status post C4-C5, C5-C6, and C6-C7 anterior cervical discectomy and fusion with use of allograft bone.    Pain control  PT/OT as outpatient  Ortho follow up  Cont w/ cervical brace  NO lifting over 10 pounds or ROM of the cervical spine  Cervical brace on at all times  May shower 48 hours after drain pulled with incision covered  Daily dressing changes to start upon drain being pulled       Obstructive sleep apnea.    Cont CPAP       Anxiety and depression.    Continue wellbutrin, lexapro, latuda, adderall, trazodone    Patient is to remain compliant with all discharge medications and instructions and to follow up as advised.   Patient encouraged to make healthy lifestyle and dietary changes.    Lace+ Score: 13  59-90 High Risk  29-58 Medium Risk  0-28   Low Risk       TCM Follow-Up Recommendation:  LACE < 29: Low Risk of readmission after discharge from the hospital. No TCM follow-up needed.      Procedures during hospitalization:    ACDF    Incidental or significant findings and recommendations (brief descriptions):  None    Lab/Test results pending at Discharge:   None    Consultants:  Consultants         Provider   Role Specialty     Israel Escamilla MD      Consulting Physician HOSPITALIST            Discharge Medication List:     Discharge Medications        START taking these medications        Instructions Prescription details   cyclobenzaprine 5 MG Tabs  Commonly known as: Flexeril      Take 1 tablet (5 mg total) by mouth every 6 (six) hours as needed for Muscle spasms.   Quantity: 45 tablet  Refills: 1     oxyCODONE-acetaminophen  MG Tabs  Commonly known as: Percocet      May take 1 tablet every 4 hours or take 2 tablets every 6 hours for pain.   Quantity: 45 tablet  Refills: 0     Sennosides 17.2 MG Tabs      Take 1 tablet (17.2 mg total) by mouth nightly.   Quantity: 30 tablet  Refills: 1            CONTINUE taking these medications        Instructions Prescription details   albuterol 108 (90 Base) MCG/ACT Aers  Commonly known as: Ventolin HFA      Inhale into the lungs every 6 (six) hours as needed for Wheezing.   Refills: 0     amphetamine-dextroamphetamine 20 MG Tabs  Commonly known as: Adderall  Notes to patient: Continue home schedule      Take by mouth every morning.   Refills: 0     buPROPion  MG Tb24  Commonly known as: Wellbutrin XL      Take 1 tablet (300 mg total) by mouth every morning.   Refills: 0     clonazePAM 0.5 MG Tabs  Commonly known as: KlonoPIN      Take 1 tablet (0.5 mg total) by mouth 2 (two) times daily as needed for Anxiety.   Refills: 0     escitalopram 20 MG Tabs  Commonly known as: Lexapro      Take 1 tablet (20 mg total) by mouth every morning.   Refills: 0     lurasidone 20 MG Tabs  Commonly known as: Latuda  Notes to patient: Continue home schedule      Take 0.5 tablets (10 mg total) by mouth daily with breakfast.   Refills: 0     Multi-Vitamin/Minerals Tabs  Notes to patient: Continue  home schedule      Take 1 tablet by mouth daily.   Refills: 0     traZODone 50 MG Tabs  Commonly known as: Desyrel      Take 1 tablet (50 mg total) by mouth nightly as needed for Sleep.   Refills: 0     Vitamin D3 1.25 MG (62501 UT) Caps  Notes to patient: Continue home schedule      Take by mouth once a week.   Refills: 0            STOP taking these medications      celecoxib 100 MG Caps  Commonly known as: CeleBREX        HYDROcodone-acetaminophen 5-325 MG Tabs  Commonly known as: Norco                  Where to Get Your Medications        Please  your prescriptions at the location directed by your doctor or nurse    Bring a paper prescription for each of these medications  cyclobenzaprine 5 MG Tabs  oxyCODONE-acetaminophen  MG Tabs  Sennosides 17.2 MG Tabs         [place]    Follow-up appointment:   Susana Coronado PA  [address]  [facility] [address] [phone]    Schedule an appointment as soon as possible for a visit in 2 week(s)  Surgery follow up    Appointments for Next 30 Days 6/11/2025 - 7/11/2025      None            Vital signs:  Temp:  [98.6 °F (37 °C)-99 °F (37.2 °C)] 99 °F (37.2 °C)  Pulse:  [64-68] 64  Resp:  [16-18] 16  BP: (108-136)/(50-75) 119/66  SpO2:  [92 %-95 %] 95 %    Physical Exam:    Gen: NAD AO x3  Chest: good air entry CTABL  CVS: normal s1 and s2 RR  Abd: NABS soft NT ND  Neuro: CN 2-12 grossly intact  Ext: no edema in bilateral LE    -----------------------------------------------------------------------------------------------  PATIENT DISCHARGE INSTRUCTIONS: See electronic chart    Selena Rao MD  Hospitalist    Time spent:  > 30 minutes    The 21st Century Cures Act makes medical notes like these available to patients in the interest of transparency. Please be advised this is a medical document. Medical documents are intended to carry relevant information, facts as evident, and the clinical opinion of the practitioner. The  medical note is intended as peer to peer communication and may appear blunt or direct. It is written in medical language and may contain abbreviations or verbiage that are unfamiliar.

## 2025-06-11 NOTE — PROGRESS NOTES
[facility]   Dr. Shahida Bee MD, SHAHAB  Susana Coronado, PAZeeC  Moon Nguyen PA-C    [address] [place] [address]  Office [phone]  Fax [phone]      No C/O of CP SOB NV. Pain controlled on Percocet 10mg. Arm pain improved  AVSS  Dressing is CDI  Drain output 15cc since 1600 on 6/10   Motor to bilateral UE 5/5 throughout  Nv intact  Homans neg     S/p C4-7 ACDF     Remove drain and discontinue antibiotics.  Mobilize  PT/OT  NO lifting over 10 pounds or ROM of the cervical spine  Cervical brace on at all times  May shower 48 hours after drain pulled with incision covered  Daily dressing changes to start upon drain being pulled  Please send patient home with gauze and tape  rx in the chart  May d/c to home from ortho standpoint if pain controlled   Please change dressing before patient leaves  F/u in two weeks

## 2025-06-11 NOTE — PLAN OF CARE
Post-op day #2. Dressing in place to Anterior Neck, [place] Collar in place. Monitoring vital signs- stable at this time. Remote tele. No acute changes noted throughout shift. Tolerating diet. Voiding up to bathroom. SCDs for DVT prophylaxis. Pain medication provided as needed. Up with standby assist and a walker. Encouraged frequent ambulation and use of incentive spirometer. Fall precautions maintained- bed alarm on, bed locked in lowest position, call light and personal belongings within reach, non-skid socks in place to bilateral feet. Frequent rounding by nursing staff. Plan to discharge home with home health once medically cleared.      Patient cleared by internal medicine, ortho surgery, PT/OT, and social work. Going home. IV removed, discharge education provided, patient sent home with all personal belongings, scripts, and discharge instructions. Addressed additional questions.      Problem: Patient Centered Care  Goal: Patient preferences are identified and integrated in the patient's plan of care  Description: Interventions:  - What would you like us to know as we care for you?   - Provide timely, complete, and accurate information to patient/family  - Incorporate patient and family knowledge, values, beliefs, and cultural backgrounds into the planning and delivery of care  - Encourage patient/family to participate in care and decision-making at the level they choose  - Honor patient and family perspectives and choices  Outcome: Progressing     Problem: Patient/Family Goals  Goal: Patient/Family Long Term Goal  Description: Patient's Long Term Goal:     Interventions:  - See additional Care Plan goals for specific interventions  Outcome: Progressing  Goal: Patient/Family Short Term Goal  Description: Patient's Short Term Goal:     Interventions:  - See additional Care Plan goals for specific interventions  Outcome: Progressing     Problem: PAIN - ADULT  Goal: Verbalizes/displays adequate comfort level or  done patient's stated pain goal  Description: INTERVENTIONS:  - Encourage pt to monitor pain and request assistance  - Assess pain using appropriate pain scale  - Administer analgesics based on type and severity of pain and evaluate response  - Implement non-pharmacological measures as appropriate and evaluate response  - Consider cultural and social influences on pain and pain management  - Manage/alleviate anxiety  - Utilize distraction and/or relaxation techniques  - Monitor for opioid side effects  - Notify MD/LIP if interventions unsuccessful or patient reports new pain  - Anticipate increased pain with activity and pre-medicate as appropriate  Outcome: Progressing     Problem: SAFETY ADULT - FALL  Goal: Free from fall injury  Description: INTERVENTIONS:  - Assess pt frequently for physical needs  - Identify cognitive and physical deficits and behaviors that affect risk of falls.  - [place] fall precautions as indicated by assessment.  - Educate pt/family on patient safety including physical limitations  - Instruct pt to call for assistance with activity based on assessment  - Modify environment to reduce risk of injury  - Provide assistive devices as appropriate  - Consider OT/PT consult to assist with strengthening/mobility  - Encourage toileting schedule  Outcome: Progressing     Problem: DISCHARGE PLANNING  Goal: Discharge to home or other facility with appropriate resources  Description: INTERVENTIONS:  - Identify barriers to discharge w/pt and caregiver  - Include patient/family/discharge partner in discharge planning  - Arrange for needed discharge resources and transportation as appropriate  - Identify discharge learning needs (meds, wound care, etc)  - Arrange for interpreters to assist at discharge as needed  - Consider post-discharge preferences of patient/family/discharge partner  - Complete POLST form as appropriate  - Assess patient's ability to be responsible for managing their own health  - Refer  to Case Management Department for coordinating discharge planning if the patient needs post-hospital services based on physician/LIP order or complex needs related to functional status, cognitive ability or social support system  Outcome: Progressing     Problem: SKIN/TISSUE INTEGRITY - ADULT  Goal: Skin integrity remains intact  Description: INTERVENTIONS  - Assess and document risk factors for pressure ulcer development  - Assess and document skin integrity  - Monitor for areas of redness and/or skin breakdown  - Initiate interventions, skin care algorithm/standards of care as needed  Outcome: Progressing  Goal: Incision(s), wounds(s) or drain site(s) healing without S/S of infection  Description: INTERVENTIONS:  - Assess and document risk factors for pressure ulcer development  - Assess and document skin integrity  - Assess and document dressing/incision, wound bed, drain sites and surrounding tissue  - Implement wound care per orders  - Initiate isolation precautions as appropriate  - Initiate Pressure Ulcer prevention bundle as indicated  Outcome: Progressing     Problem: MUSCULOSKELETAL - ADULT  Goal: Return mobility to safest level of function  Description: INTERVENTIONS:  - Assess patient stability and activity tolerance for standing, transferring and ambulating w/ or w/o assistive devices  - Assist with transfers and ambulation using safe patient handling equipment as needed  - Ensure adequate protection for wounds/incisions during mobilization  - Obtain PT/OT consults as needed  - Advance activity as appropriate  - Communicate ordered activity level and limitations with patient/family  Outcome: Progressing  Goal: Maintain proper alignment of affected body part  Description: INTERVENTIONS:  - Support and protect limb and body alignment per provider's orders  - Instruct and reinforce with patient and family use of appropriate assistive device and precautions (e.g. spinal or hip dislocation  precautions)  Outcome: Progressing     Problem: Impaired Swallowing  Goal: Minimize aspiration risk  Description: Interventions:  - Patient should be alert and upright for all feedings (90 degrees preferred)  - Offer food and liquids at a slow rate  - No straws  - Encourage small bites of food and small sips of liquid  - Offer pills one at a time, crush or deliver with applesauce as needed  - Discontinue feeding and notify MD (or speech pathologist) if coughing or persistent throat clearing or wet/gurgly vocal quality is noted  Outcome: Progressing

## 2025-06-11 NOTE — DISCHARGE INSTRUCTIONS
NO lifting over 10 pounds or ROM of the cervical spine  Cervical brace on at all times  May shower 48 hours after drain pulled with incision covered  Daily dressing changes  F/u in two weeks

## 2025-06-11 NOTE — PLAN OF CARE
Patient alert and oriented x4. On room air. VSS. On tele, no calls this shift. Scd & teds for dvt prophylaxis. Voiding freely. Ambulating stand by assist. Aspen collar in place at all times, discomfort to patient mepilex in place. Percocet for pain management. Hemovac in place, compressed. Plan is discharge home once hemovac removed and medically clear.    Problem: Patient Centered Care  Goal: Patient preferences are identified and integrated in the patient's plan of care  Description: Interventions:  - Provide timely, complete, and accurate information to patient/family  - Incorporate patient and family knowledge, values, beliefs, and cultural backgrounds into the planning and delivery of care  - Encourage patient/family to participate in care and decision-making at the level they choose  - Honor patient and family perspectives and choices  Outcome: Progressing     Problem: PAIN - ADULT  Goal: Verbalizes/displays adequate comfort level or patient's stated pain goal  Description: INTERVENTIONS:  - Encourage pt to monitor pain and request assistance  - Assess pain using appropriate pain scale  - Administer analgesics based on type and severity of pain and evaluate response  - Implement non-pharmacological measures as appropriate and evaluate response  - Consider cultural and social influences on pain and pain management  - Manage/alleviate anxiety  - Utilize distraction and/or relaxation techniques  - Monitor for opioid side effects  - Notify MD/LIP if interventions unsuccessful or patient reports new pain  - Anticipate increased pain with activity and pre-medicate as appropriate  Outcome: Progressing     Problem: SAFETY ADULT - FALL  Goal: Free from fall injury  Description: INTERVENTIONS:  - Assess pt frequently for physical needs  - Identify cognitive and physical deficits and behaviors that affect risk of falls.  - [place] fall precautions as indicated by assessment.  - Educate pt/family on patient safety  including physical limitations  - Instruct pt to call for assistance with activity based on assessment  - Modify environment to reduce risk of injury  - Provide assistive devices as appropriate  - Consider OT/PT consult to assist with strengthening/mobility  - Encourage toileting schedule  Outcome: Progressing     Problem: DISCHARGE PLANNING  Goal: Discharge to home or other facility with appropriate resources  Description: INTERVENTIONS:  - Identify barriers to discharge w/pt and caregiver  - Include patient/family/discharge partner in discharge planning  - Arrange for needed discharge resources and transportation as appropriate  - Identify discharge learning needs (meds, wound care, etc)  - Arrange for interpreters to assist at discharge as needed  - Consider post-discharge preferences of patient/family/discharge partner  - Complete POLST form as appropriate  - Assess patient's ability to be responsible for managing their own health  - Refer to Case Management Department for coordinating discharge planning if the patient needs post-hospital services based on physician/LIP order or complex needs related to functional status, cognitive ability or social support system  Outcome: Progressing     Problem: SKIN/TISSUE INTEGRITY - ADULT  Goal: Skin integrity remains intact  Description: INTERVENTIONS  - Assess and document risk factors for pressure ulcer development  - Assess and document skin integrity  - Monitor for areas of redness and/or skin breakdown  - Initiate interventions, skin care algorithm/standards of care as needed  Outcome: Progressing  Goal: Incision(s), wounds(s) or drain site(s) healing without S/S of infection  Description: INTERVENTIONS:  - Assess and document risk factors for pressure ulcer development  - Assess and document skin integrity  - Assess and document dressing/incision, wound bed, drain sites and surrounding tissue  - Implement wound care per orders  - Initiate isolation precautions as  appropriate  - Initiate Pressure Ulcer prevention bundle as indicated  Outcome: Progressing     Problem: MUSCULOSKELETAL - ADULT  Goal: Return mobility to safest level of function  Description: INTERVENTIONS:  - Assess patient stability and activity tolerance for standing, transferring and ambulating w/ or w/o assistive devices  - Assist with transfers and ambulation using safe patient handling equipment as needed  - Ensure adequate protection for wounds/incisions during mobilization  - Obtain PT/OT consults as needed  - Advance activity as appropriate  - Communicate ordered activity level and limitations with patient/family  Outcome: Progressing  Goal: Maintain proper alignment of affected body part  Description: INTERVENTIONS:  - Support and protect limb and body alignment per provider's orders  - Instruct and reinforce with patient and family use of appropriate assistive device and precautions (e.g. spinal or hip dislocation precautions)  Outcome: Progressing     Problem: Impaired Swallowing  Goal: Minimize aspiration risk  Description: Interventions:  - Patient should be alert and upright for all feedings (90 degrees preferred)  - Offer food and liquids at a slow rate  - No straws  - Encourage small bites of food and small sips of liquid  - Offer pills one at a time, crush or deliver with applesauce as needed  - Discontinue feeding and notify MD (or speech pathologist) if coughing or persistent throat clearing or wet/gurgly vocal quality is noted  Outcome: Progressing

## (undated) DEVICE — MAXCESS C MODULE

## (undated) DEVICE — KIT EVAC 400CC DIA1/8IN H PAT 12.5IN 3 SPR

## (undated) DEVICE — AEGIS 1" DISK 4MM HOLE, PEEL OPEN: Brand: MEDLINE

## (undated) DEVICE — GLOVE SUR 6.5 SENSICARE PI MIC PIP CRM PWD F

## (undated) DEVICE — ANTIBACTERIAL UNDYED BRAIDED (POLYGLACTIN 910), SYNTHETIC ABSORBABLE SUTURE: Brand: COATED VICRYL

## (undated) DEVICE — MONITORING NEUROPHYSIOLOGICAL

## (undated) DEVICE — 3.0MM PRECISION NEURO (MATCH HEAD)

## (undated) DEVICE — APPLICATOR PREP 10.5ML ORNG CHG 2% ISO ALC

## (undated) DEVICE — C-ARMOR C-ARM EQUIPMENT COVERS CLEAR STERILE UNIVERSAL FIT 12 PER CASE: Brand: C-ARMOR

## (undated) DEVICE — INTENDED USE FOR SURGICAL MARKING ON INTACT SKIN, ALSO PROVIDES A PERMANENT METHOD OF IDENTIFYING OBJECTS IN THE OPERATING ROOM: Brand: WRITESITE® PLUS MINI PREP RESISTANT MARKER

## (undated) DEVICE — KIT EVAC 400CC 3/16IN PVC RND DRN Y CONN

## (undated) DEVICE — BIT DRL 11MM TI ALLY FOR ACP SPNL PLT SYS

## (undated) DEVICE — DRAPE,THYROID,SOFT,STERILE: Brand: MEDLINE

## (undated) DEVICE — ANTIBACTERIAL VIOLET BRAIDED (POLYGLACTIN 910), SYNTHETIC ABSORBABLE SUTURE: Brand: COATED VICRYL

## (undated) DEVICE — SPONGE: SPECIALTY PEANUT XR 100/CS: Brand: MEDICAL ACTION INDUSTRIES

## (undated) DEVICE — HEMOSTAT KIT SURGIFLO THROM 8ML

## (undated) DEVICE — TRAY CATH FOLEY 16FR INCLUDE BARDX IC COMPLT CARE

## (undated) DEVICE — ZZ-CONVERTED-TO-522442- SPONGE 4X4 10PK

## (undated) DEVICE — SUCTION CANISTER, 3000CC,SAFELINER: Brand: DEROYAL

## (undated) DEVICE — FORCEP CUSH BAY BP DISP 7.5IN

## (undated) DEVICE — GLOVE SUR 8 SENSICARE PI MIC PIP CRM PWD F

## (undated) DEVICE — CERVICAL CDS: Brand: MEDLINE INDUSTRIES, INC.

## (undated) DEVICE — 3M™ STERI-STRIP™ REINFORCED ADHESIVE SKIN CLOSURES, R1547, 1/2 IN X 4 IN (12 MM X 100 MM), 6 STRIPS/ENVELOPE: Brand: 3M™ STERI-STRIP™

## (undated) DEVICE — INSULATED NEEDLE ELECTRODE: Brand: EDGE

## (undated) DEVICE — SHEET,DRAPE,53X77,STERILE: Brand: MEDLINE

## (undated) DEVICE — GLOVE SUR 8.5 SENSICARE NEOPR PWD F

## (undated) DEVICE — GAUZE,SPONGE,4"X4",16PLY,XRAY,STRL,LF: Brand: MEDLINE

## (undated) DEVICE — EXOFIN PRECISION PEN HIGH VISCOSITY TOPICAL SKIN ADHESIVE: Brand: EXOFIN PRECISION PEN, 1G

## (undated) DEVICE — GLOVE SUR 7 SENSICARE PI PIP GRN PWD F

## (undated) DEVICE — PIN DISTR L12MM SPNL MAXCESS-C

## (undated) DEVICE — SOLUTION IRRIG 1000ML 0.9% NACL USP BTL

## (undated) DEVICE — GLOVE SUR 6.5 SENSICARE PI PIP GRN PWD F